# Patient Record
Sex: FEMALE | Race: WHITE | NOT HISPANIC OR LATINO | Employment: FULL TIME | URBAN - NONMETROPOLITAN AREA
[De-identification: names, ages, dates, MRNs, and addresses within clinical notes are randomized per-mention and may not be internally consistent; named-entity substitution may affect disease eponyms.]

---

## 2023-09-13 ENCOUNTER — OFFICE VISIT (OUTPATIENT)
Dept: FAMILY MEDICINE CLINIC | Facility: CLINIC | Age: 33
End: 2023-09-13
Payer: COMMERCIAL

## 2023-09-13 VITALS
RESPIRATION RATE: 18 BRPM | OXYGEN SATURATION: 100 % | HEIGHT: 66 IN | WEIGHT: 142.4 LBS | TEMPERATURE: 98 F | HEART RATE: 63 BPM | SYSTOLIC BLOOD PRESSURE: 92 MMHG | DIASTOLIC BLOOD PRESSURE: 65 MMHG | BODY MASS INDEX: 22.88 KG/M2

## 2023-09-13 DIAGNOSIS — F32.0 CURRENT MILD EPISODE OF MAJOR DEPRESSIVE DISORDER, UNSPECIFIED WHETHER RECURRENT: Chronic | ICD-10-CM

## 2023-09-13 DIAGNOSIS — M25.531 RIGHT WRIST PAIN: ICD-10-CM

## 2023-09-13 DIAGNOSIS — J30.2 SEASONAL ALLERGIES: Chronic | ICD-10-CM

## 2023-09-13 DIAGNOSIS — Z13.220 SCREENING, LIPID: ICD-10-CM

## 2023-09-13 DIAGNOSIS — Z13.29 SCREENING FOR THYROID DISORDER: ICD-10-CM

## 2023-09-13 DIAGNOSIS — Z76.89 ESTABLISHING CARE WITH NEW DOCTOR, ENCOUNTER FOR: Primary | ICD-10-CM

## 2023-09-13 RX ORDER — MONTELUKAST SODIUM 10 MG/1
10 TABLET ORAL NIGHTLY
Qty: 30 TABLET | Refills: 5 | Status: SHIPPED | OUTPATIENT
Start: 2023-09-13

## 2023-09-13 RX ORDER — DULOXETIN HYDROCHLORIDE 30 MG/1
30 CAPSULE, DELAYED RELEASE ORAL DAILY
Qty: 30 CAPSULE | Refills: 5 | Status: SHIPPED | OUTPATIENT
Start: 2023-09-13

## 2023-09-13 NOTE — PROGRESS NOTES
Chief Complaint   Patient presents with    Establish Care     Was going to a [facility] In [place].      Wrist Pain     Right wrist pain, went to Urgent Care for this and xray was normal. 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 Pain comes and goes, lasted longer than it usually does but feels better today.        Subjective          Mora Magaña presents to [facility]    History of Present Illness  She is here to establish care today. She was going to [facility] in [place] but moved here a few years ago. She is having right wrist pain. She was seen at  for this yesterday and prescribed a wrist brace and Diclofenac. She is a  and works with her right hand a lot with repetitive movements. She has no swelling. The pain is on the ulnar side of the wrist. She was encouraged to take the diclofenac and rest the wrist as much as possible. She has a history of seasonal allergies and depression that she takes medication for and is stable on.     Past History:  Medical History: has a past medical history of Allergic, Asthma, and Migraines.   Surgical History: has no past surgical history on file.   Family History: family history includes Cancer in her paternal grandfather; Diabetes in her paternal grandmother; Heart disease in her paternal grandmother.   Social History: reports that she has never smoked. She has never been exposed to tobacco smoke. She has never used smokeless tobacco. She reports current alcohol use. She reports that she does not use drugs.  Allergies: Patient has no known allergies.  (Not in a hospital admission)       Social History     Socioeconomic History    Marital status: Single   Tobacco Use    Smoking status: Never     Passive exposure: Never    Smokeless tobacco: Never   Vaping Use    Vaping Use: Never used   Substance and Sexual Activity    Alcohol use: Yes     Comment: SOCIAL    Drug use: Never     "Sexual activity: Yes     Partners: Male       Health Maintenance Due   Topic Date Due    HEPATITIS C SCREENING  Never done       Objective     Vital Signs:   BP 92/65 (BP Location: Left arm, Patient Position: Sitting)   Pulse 63   Temp 98 °F (36.7 °C) (Infrared)   Resp 18   Ht 167.6 cm (66\")   Wt 64.6 kg (142 lb 6.4 oz)   SpO2 100%   BMI 22.98 kg/m²       Physical Exam  Constitutional:       Appearance: Normal appearance.   HENT:      Nose: Nose normal.      Mouth/Throat:      Mouth: Mucous membranes are moist.   Cardiovascular:      Rate and Rhythm: Normal rate and regular rhythm.      Pulses: Normal pulses.      Heart sounds: Normal heart sounds.   Pulmonary:      Effort: Pulmonary effort is normal.      Breath sounds: Normal breath sounds.   Musculoskeletal:         General: Tenderness (right wrist) present.   Skin:     General: Skin is warm and dry.   Neurological:      General: No focal deficit present.      Mental Status: She is alert and oriented to person, place, and time.   Psychiatric:         Mood and Affect: Mood normal.         Behavior: Behavior normal.        Review of Systems     Result Review :                 Assessment and Plan    Diagnoses and all orders for this visit:    1. Establishing care with new doctor, encounter for (Primary)    2. Screening for thyroid disorder  -     TSH+Free T4; Future    3. Screening, lipid  -     Lipid panel; Future    4. Right wrist pain  Comments:  Wear brace as prescribed.    Start diclofenac 50 mg TID  Orders:  -     CBC w AUTO Differential; Future  -     Comprehensive metabolic panel; Future    5. Current mild episode of major depressive disorder, unspecified whether recurrent  Comments:  Continue cymbalta 30 mg daily  Assessment & Plan:  Patient's depression is recurrent and is mild without psychosis. Their depression is currently in partial remission and the condition is improving with treatment. This will be reassessed at the next regular appointment. " F/U as described:patient will continue current medication therapy.    Orders:  -     CBC w AUTO Differential; Future  -     Comprehensive metabolic panel; Future  -     DULoxetine (CYMBALTA) 30 MG capsule; Take 1 capsule by mouth Daily.  Dispense: 30 capsule; Refill: 5    6. Seasonal allergies  Comments:  Continue sinulair 10 mg daily  Orders:  -     montelukast (SINGULAIR) 10 MG tablet; Take 1 tablet by mouth Every Night.  Dispense: 30 tablet; Refill: 5          Pt thought to be clinically stable at this time.    Follow Up   Return in about 6 months (around 3/13/2024), or if symptoms worsen or fail to improve.  Patient was given instructions and counseling regarding her condition or for health maintenance advice. Please see specific information pulled into the AVS if appropriate.

## 2024-03-21 ENCOUNTER — LAB (OUTPATIENT)
Dept: LAB | Facility: HOSPITAL | Age: 34
End: 2024-03-21
Payer: COMMERCIAL

## 2024-03-21 ENCOUNTER — OFFICE VISIT (OUTPATIENT)
Dept: FAMILY MEDICINE CLINIC | Facility: CLINIC | Age: 34
End: 2024-03-21
Payer: COMMERCIAL

## 2024-03-21 VITALS
HEIGHT: 66 IN | DIASTOLIC BLOOD PRESSURE: 69 MMHG | BODY MASS INDEX: 22.92 KG/M2 | SYSTOLIC BLOOD PRESSURE: 115 MMHG | TEMPERATURE: 98.4 F | HEART RATE: 67 BPM | OXYGEN SATURATION: 100 % | WEIGHT: 142.6 LBS

## 2024-03-21 DIAGNOSIS — Z13.29 SCREENING FOR THYROID DISORDER: ICD-10-CM

## 2024-03-21 DIAGNOSIS — M25.531 RIGHT WRIST PAIN: ICD-10-CM

## 2024-03-21 DIAGNOSIS — F32.0 CURRENT MILD EPISODE OF MAJOR DEPRESSIVE DISORDER, UNSPECIFIED WHETHER RECURRENT: ICD-10-CM

## 2024-03-21 DIAGNOSIS — Z11.59 NEED FOR HEPATITIS C SCREENING TEST: ICD-10-CM

## 2024-03-21 DIAGNOSIS — F32.0 CURRENT MILD EPISODE OF MAJOR DEPRESSIVE DISORDER, UNSPECIFIED WHETHER RECURRENT: Chronic | ICD-10-CM

## 2024-03-21 DIAGNOSIS — Z13.220 SCREENING FOR LIPID DISORDERS: ICD-10-CM

## 2024-03-21 DIAGNOSIS — J30.2 SEASONAL ALLERGIES: Chronic | ICD-10-CM

## 2024-03-21 DIAGNOSIS — R53.83 OTHER FATIGUE: ICD-10-CM

## 2024-03-21 DIAGNOSIS — Z00.00 ANNUAL PHYSICAL EXAM: Primary | ICD-10-CM

## 2024-03-21 DIAGNOSIS — D72.819 LEUKOPENIA, UNSPECIFIED TYPE: ICD-10-CM

## 2024-03-21 LAB
25(OH)D3 SERPL-MCNC: 41.3 NG/ML (ref 30–100)
ALBUMIN SERPL-MCNC: 4.7 G/DL (ref 3.5–5.2)
ALBUMIN/GLOB SERPL: 1.5 G/DL
ALP SERPL-CCNC: 38 U/L (ref 39–117)
ALT SERPL W P-5'-P-CCNC: 12 U/L (ref 1–33)
ANION GAP SERPL CALCULATED.3IONS-SCNC: 8.4 MMOL/L (ref 5–15)
AST SERPL-CCNC: 18 U/L (ref 1–32)
BASOPHILS # BLD AUTO: 0.05 10*3/MM3 (ref 0–0.2)
BASOPHILS NFR BLD AUTO: 1.5 % (ref 0–1.5)
BILIRUB SERPL-MCNC: 0.6 MG/DL (ref 0–1.2)
BUN SERPL-MCNC: 13 MG/DL (ref 6–20)
BUN/CREAT SERPL: 13.1 (ref 7–25)
CALCIUM SPEC-SCNC: 9.7 MG/DL (ref 8.6–10.5)
CHLORIDE SERPL-SCNC: 102 MMOL/L (ref 98–107)
CO2 SERPL-SCNC: 28.6 MMOL/L (ref 22–29)
CREAT SERPL-MCNC: 0.99 MG/DL (ref 0.57–1)
DEPRECATED RDW RBC AUTO: 42 FL (ref 37–54)
EGFRCR SERPLBLD CKD-EPI 2021: 77.4 ML/MIN/1.73
EOSINOPHIL # BLD AUTO: 0.2 10*3/MM3 (ref 0–0.4)
EOSINOPHIL NFR BLD AUTO: 6 % (ref 0.3–6.2)
ERYTHROCYTE [DISTWIDTH] IN BLOOD BY AUTOMATED COUNT: 13.9 % (ref 12.3–15.4)
FOLATE SERPL-MCNC: 9.32 NG/ML (ref 4.78–24.2)
GLOBULIN UR ELPH-MCNC: 3.1 GM/DL
GLUCOSE SERPL-MCNC: 85 MG/DL (ref 65–99)
HCT VFR BLD AUTO: 34.6 % (ref 34–46.6)
HCV AB SER DONR QL: NORMAL
HGB BLD-MCNC: 11.3 G/DL (ref 12–15.9)
IMM GRANULOCYTES # BLD AUTO: 0.01 10*3/MM3 (ref 0–0.05)
IMM GRANULOCYTES NFR BLD AUTO: 0.3 % (ref 0–0.5)
IRON 24H UR-MRATE: 36 MCG/DL (ref 37–145)
IRON SATN MFR SERPL: 7 % (ref 20–50)
LYMPHOCYTES # BLD AUTO: 1.48 10*3/MM3 (ref 0.7–3.1)
LYMPHOCYTES NFR BLD AUTO: 44 % (ref 19.6–45.3)
MCH RBC QN AUTO: 27.1 PG (ref 26.6–33)
MCHC RBC AUTO-ENTMCNC: 32.7 G/DL (ref 31.5–35.7)
MCV RBC AUTO: 83 FL (ref 79–97)
MONOCYTES # BLD AUTO: 0.27 10*3/MM3 (ref 0.1–0.9)
MONOCYTES NFR BLD AUTO: 8 % (ref 5–12)
NEUTROPHILS NFR BLD AUTO: 1.35 10*3/MM3 (ref 1.7–7)
NEUTROPHILS NFR BLD AUTO: 40.2 % (ref 42.7–76)
NRBC BLD AUTO-RTO: 0 /100 WBC (ref 0–0.2)
PLATELET # BLD AUTO: 293 10*3/MM3 (ref 140–450)
PMV BLD AUTO: 11 FL (ref 6–12)
POTASSIUM SERPL-SCNC: 4.1 MMOL/L (ref 3.5–5.2)
PROT SERPL-MCNC: 7.8 G/DL (ref 6–8.5)
RBC # BLD AUTO: 4.17 10*6/MM3 (ref 3.77–5.28)
SODIUM SERPL-SCNC: 139 MMOL/L (ref 136–145)
T4 FREE SERPL-MCNC: 1.18 NG/DL (ref 0.93–1.7)
TIBC SERPL-MCNC: 533 MCG/DL (ref 298–536)
TRANSFERRIN SERPL-MCNC: 358 MG/DL (ref 200–360)
TSH SERPL DL<=0.05 MIU/L-ACNC: 1.22 UIU/ML (ref 0.27–4.2)
VIT B12 BLD-MCNC: 270 PG/ML (ref 211–946)
WBC NRBC COR # BLD AUTO: 3.36 10*3/MM3 (ref 3.4–10.8)

## 2024-03-21 PROCEDURE — 84443 ASSAY THYROID STIM HORMONE: CPT

## 2024-03-21 PROCEDURE — 84439 ASSAY OF FREE THYROXINE: CPT

## 2024-03-21 PROCEDURE — 99395 PREV VISIT EST AGE 18-39: CPT

## 2024-03-21 PROCEDURE — 36415 COLL VENOUS BLD VENIPUNCTURE: CPT

## 2024-03-21 PROCEDURE — 84466 ASSAY OF TRANSFERRIN: CPT

## 2024-03-21 PROCEDURE — 82746 ASSAY OF FOLIC ACID SERUM: CPT

## 2024-03-21 PROCEDURE — 82607 VITAMIN B-12: CPT

## 2024-03-21 PROCEDURE — 83540 ASSAY OF IRON: CPT

## 2024-03-21 PROCEDURE — 86803 HEPATITIS C AB TEST: CPT

## 2024-03-21 PROCEDURE — 85025 COMPLETE CBC W/AUTO DIFF WBC: CPT

## 2024-03-21 PROCEDURE — 80053 COMPREHEN METABOLIC PANEL: CPT

## 2024-03-21 PROCEDURE — 82306 VITAMIN D 25 HYDROXY: CPT

## 2024-03-21 RX ORDER — DULOXETIN HYDROCHLORIDE 30 MG/1
30 CAPSULE, DELAYED RELEASE ORAL DAILY
Qty: 30 CAPSULE | Refills: 5 | Status: SHIPPED | OUTPATIENT
Start: 2024-03-21

## 2024-03-21 RX ORDER — LEVOCETIRIZINE DIHYDROCHLORIDE 5 MG/1
5 TABLET, FILM COATED ORAL EVERY EVENING
COMMUNITY

## 2024-03-21 RX ORDER — MONTELUKAST SODIUM 10 MG/1
10 TABLET ORAL NIGHTLY
Qty: 30 TABLET | Refills: 5 | Status: SHIPPED | OUTPATIENT
Start: 2024-03-21

## 2024-03-21 NOTE — PROGRESS NOTES
"Chief Complaint  Annual Exam and Fatigue    Subjective        Mora Magaña presents to [facility]  History of Present Illness  Mora presents to the clinic for management of her chronic conditions and with complaints of fatigue. She states that over the past couple of months she has been very fatigued and just feels like she could sleep for days. She has no other complaints at this time.    Denies any chest pain, SOA, N/V/D, dizziness, syncope      Objective   Vital Signs:  /69 (BP Location: Left arm, Patient Position: Sitting, Cuff Size: Adult)   Pulse 67   Temp 98.4 °F (36.9 °C)   Ht 167.6 cm (66\")   Wt 64.7 kg (142 lb 9.6 oz)   SpO2 100%   BMI 23.02 kg/m²   Estimated body mass index is 23.02 kg/m² as calculated from the following:    Height as of this encounter: 167.6 cm (66\").    Weight as of this encounter: 64.7 kg (142 lb 9.6 oz).       BMI is within normal parameters. No other follow-up for BMI required.      Physical Exam  Constitutional:       Appearance: Normal appearance.   HENT:      Nose: Nose normal.      Mouth/Throat:      Mouth: Mucous membranes are moist.   Cardiovascular:      Rate and Rhythm: Normal rate and regular rhythm.      Pulses: Normal pulses.      Heart sounds: Normal heart sounds.   Pulmonary:      Effort: Pulmonary effort is normal.      Breath sounds: Normal breath sounds.   Skin:     General: Skin is warm and dry.   Neurological:      General: No focal deficit present.      Mental Status: She is alert and oriented to person, place, and time.   Psychiatric:         Mood and Affect: Mood normal.         Behavior: Behavior normal.        Result Review :                     Assessment and Plan     Diagnoses and all orders for this visit:    1. Annual physical exam (Primary)    2. Need for hepatitis C screening test  -     Hepatitis C Antibody; Future    3. Current mild episode of major depressive disorder, unspecified whether " recurrent  Assessment & Plan:  Patient's depression is a recurrent episode that is mild without psychosis. Depression is in full remission and stable.    Plan:   Continue current medication therapy     Followup in 1 year.     Orders:  -     DULoxetine (CYMBALTA) 30 MG capsule; Take 1 capsule by mouth Daily.  Dispense: 30 capsule; Refill: 5    4. Screening for thyroid disorder  -     TSH+Free T4; Future    5. Screening for lipid disorders  -     Lipid panel; Future    6. Other fatigue  -     CBC & Differential; Future  -     Comprehensive metabolic panel; Future  -     Iron Profile; Future  -     Vitamin D 25 hydroxy; Future  -     Vitamin B12 & Folate; Future    7. Current mild episode of major depressive disorder, unspecified whether recurrent  Comments:  Continue cymbalta 30 mg daily  Assessment & Plan:  Patient's depression is a recurrent episode that is mild without psychosis. Depression is in full remission and stable.    Plan:   Continue current medication therapy     Followup in 1 year.     Orders:  -     DULoxetine (CYMBALTA) 30 MG capsule; Take 1 capsule by mouth Daily.  Dispense: 30 capsule; Refill: 5    8. Seasonal allergies  Comments:  Continue sinulair 10 mg daily  Assessment & Plan:  Stable continue on xyzal.    Orders:  -     montelukast (SINGULAIR) 10 MG tablet; Take 1 tablet by mouth Every Night.  Dispense: 30 tablet; Refill: 5      Discussed age appropriate preventative measures. Written information given. Patient verbalized understanding.  Routine exams, lab work, routine screenings, seatbelt safety, healthy diet.        Follow Up     Return in about 1 year (around 3/21/2025).  Patient was given instructions and counseling regarding her condition or for health maintenance advice. Please see specific information pulled into the AVS if appropriate.

## 2024-03-21 NOTE — ASSESSMENT & PLAN NOTE
Patient's depression is a recurrent episode that is mild without psychosis. Depression is in full remission and stable.    Plan:   Continue current medication therapy     Followup in 1 year.

## 2024-03-22 DIAGNOSIS — D72.819 LEUKOPENIA, UNSPECIFIED TYPE: Primary | ICD-10-CM

## 2024-03-22 LAB
LAB AP CASE REPORT: NORMAL
PATH REPORT.FINAL DX SPEC: NORMAL
PATHOLOGY REVIEW: YES

## 2024-03-27 DIAGNOSIS — D72.819 LEUKOPENIA, UNSPECIFIED TYPE: Primary | ICD-10-CM

## 2024-06-06 ENCOUNTER — LAB (OUTPATIENT)
Dept: LAB | Facility: HOSPITAL | Age: 34
End: 2024-06-06
Payer: COMMERCIAL

## 2024-06-06 DIAGNOSIS — Z13.220 SCREENING, LIPID: ICD-10-CM

## 2024-06-06 LAB
CHOLEST SERPL-MCNC: 175 MG/DL (ref 0–200)
HDLC SERPL-MCNC: 71 MG/DL (ref 40–60)
LDLC SERPL CALC-MCNC: 91 MG/DL (ref 0–100)
LDLC/HDLC SERPL: 1.26 {RATIO}
TRIGL SERPL-MCNC: 71 MG/DL (ref 0–150)
VLDLC SERPL-MCNC: 13 MG/DL (ref 5–40)

## 2024-06-06 PROCEDURE — 80061 LIPID PANEL: CPT

## 2024-06-06 PROCEDURE — 36415 COLL VENOUS BLD VENIPUNCTURE: CPT

## 2024-08-07 RX ORDER — BUPROPION HYDROCHLORIDE 150 MG/1
150 TABLET ORAL DAILY
Qty: 30 TABLET | Refills: 2 | Status: SHIPPED | OUTPATIENT
Start: 2024-08-07

## 2024-11-26 DIAGNOSIS — F32.0 CURRENT MILD EPISODE OF MAJOR DEPRESSIVE DISORDER, UNSPECIFIED WHETHER RECURRENT: Chronic | ICD-10-CM

## 2024-11-26 DIAGNOSIS — J30.2 SEASONAL ALLERGIES: Chronic | ICD-10-CM

## 2024-11-26 RX ORDER — MONTELUKAST SODIUM 10 MG/1
10 TABLET ORAL EVERY EVENING
Qty: 30 TABLET | Refills: 4 | Status: SHIPPED | OUTPATIENT
Start: 2024-11-26

## 2024-11-26 RX ORDER — DULOXETIN HYDROCHLORIDE 30 MG/1
30 CAPSULE, DELAYED RELEASE ORAL DAILY
Qty: 30 CAPSULE | Refills: 4 | Status: SHIPPED | OUTPATIENT
Start: 2024-11-26

## 2024-12-09 ENCOUNTER — OFFICE VISIT (OUTPATIENT)
Dept: FAMILY MEDICINE CLINIC | Facility: CLINIC | Age: 34
End: 2024-12-09
Payer: COMMERCIAL

## 2024-12-09 ENCOUNTER — LAB (OUTPATIENT)
Dept: LAB | Facility: HOSPITAL | Age: 34
End: 2024-12-09
Payer: COMMERCIAL

## 2024-12-09 VITALS
DIASTOLIC BLOOD PRESSURE: 73 MMHG | BODY MASS INDEX: 22.98 KG/M2 | HEIGHT: 66 IN | RESPIRATION RATE: 18 BRPM | TEMPERATURE: 96.6 F | HEART RATE: 72 BPM | WEIGHT: 143 LBS | SYSTOLIC BLOOD PRESSURE: 117 MMHG | OXYGEN SATURATION: 100 %

## 2024-12-09 DIAGNOSIS — J30.2 SEASONAL ALLERGIES: ICD-10-CM

## 2024-12-09 DIAGNOSIS — F32.0 CURRENT MILD EPISODE OF MAJOR DEPRESSIVE DISORDER, UNSPECIFIED WHETHER RECURRENT: ICD-10-CM

## 2024-12-09 DIAGNOSIS — R53.83 OTHER FATIGUE: ICD-10-CM

## 2024-12-09 DIAGNOSIS — E55.9 VITAMIN D DEFICIENCY: ICD-10-CM

## 2024-12-09 DIAGNOSIS — Z13.29 SCREENING FOR THYROID DISORDER: ICD-10-CM

## 2024-12-09 DIAGNOSIS — D50.9 IRON DEFICIENCY ANEMIA, UNSPECIFIED IRON DEFICIENCY ANEMIA TYPE: ICD-10-CM

## 2024-12-09 DIAGNOSIS — D50.9 IRON DEFICIENCY ANEMIA, UNSPECIFIED IRON DEFICIENCY ANEMIA TYPE: Primary | ICD-10-CM

## 2024-12-09 LAB
25(OH)D3 SERPL-MCNC: 41.1 NG/ML (ref 30–100)
ALBUMIN SERPL-MCNC: 4.2 G/DL (ref 3.5–5.2)
ALBUMIN/GLOB SERPL: 1.2 G/DL
ALP SERPL-CCNC: 38 U/L (ref 39–117)
ALT SERPL W P-5'-P-CCNC: 16 U/L (ref 1–33)
ANION GAP SERPL CALCULATED.3IONS-SCNC: 10.5 MMOL/L (ref 5–15)
AST SERPL-CCNC: 23 U/L (ref 1–32)
BASOPHILS # BLD AUTO: 0.04 10*3/MM3 (ref 0–0.2)
BASOPHILS NFR BLD AUTO: 0.7 % (ref 0–1.5)
BILIRUB SERPL-MCNC: 0.5 MG/DL (ref 0–1.2)
BUN SERPL-MCNC: 10 MG/DL (ref 6–20)
BUN/CREAT SERPL: 10 (ref 7–25)
CALCIUM SPEC-SCNC: 9.3 MG/DL (ref 8.6–10.5)
CHLORIDE SERPL-SCNC: 102 MMOL/L (ref 98–107)
CO2 SERPL-SCNC: 25.5 MMOL/L (ref 22–29)
CREAT SERPL-MCNC: 1 MG/DL (ref 0.57–1)
DEPRECATED RDW RBC AUTO: 39.1 FL (ref 37–54)
EGFRCR SERPLBLD CKD-EPI 2021: 76 ML/MIN/1.73
EOSINOPHIL # BLD AUTO: 0.42 10*3/MM3 (ref 0–0.4)
EOSINOPHIL NFR BLD AUTO: 7.8 % (ref 0.3–6.2)
ERYTHROCYTE [DISTWIDTH] IN BLOOD BY AUTOMATED COUNT: 12 % (ref 12.3–15.4)
FERRITIN SERPL-MCNC: 25 NG/ML (ref 13–150)
FOLATE SERPL-MCNC: 8.66 NG/ML (ref 4.78–24.2)
GLOBULIN UR ELPH-MCNC: 3.4 GM/DL
GLUCOSE SERPL-MCNC: 78 MG/DL (ref 65–99)
HCT VFR BLD AUTO: 35.7 % (ref 34–46.6)
HGB BLD-MCNC: 12.2 G/DL (ref 12–15.9)
IMM GRANULOCYTES # BLD AUTO: 0.01 10*3/MM3 (ref 0–0.05)
IMM GRANULOCYTES NFR BLD AUTO: 0.2 % (ref 0–0.5)
IRON 24H UR-MRATE: 41 MCG/DL (ref 37–145)
IRON SATN MFR SERPL: 9 % (ref 20–50)
LYMPHOCYTES # BLD AUTO: 1.23 10*3/MM3 (ref 0.7–3.1)
LYMPHOCYTES NFR BLD AUTO: 22.8 % (ref 19.6–45.3)
MCH RBC QN AUTO: 31 PG (ref 26.6–33)
MCHC RBC AUTO-ENTMCNC: 34.2 G/DL (ref 31.5–35.7)
MCV RBC AUTO: 90.6 FL (ref 79–97)
MONOCYTES # BLD AUTO: 0.42 10*3/MM3 (ref 0.1–0.9)
MONOCYTES NFR BLD AUTO: 7.8 % (ref 5–12)
NEUTROPHILS NFR BLD AUTO: 3.27 10*3/MM3 (ref 1.7–7)
NEUTROPHILS NFR BLD AUTO: 60.7 % (ref 42.7–76)
NRBC BLD AUTO-RTO: 0 /100 WBC (ref 0–0.2)
PLATELET # BLD AUTO: 229 10*3/MM3 (ref 140–450)
PMV BLD AUTO: 10.8 FL (ref 6–12)
POTASSIUM SERPL-SCNC: 3.8 MMOL/L (ref 3.5–5.2)
PROT SERPL-MCNC: 7.6 G/DL (ref 6–8.5)
RBC # BLD AUTO: 3.94 10*6/MM3 (ref 3.77–5.28)
SODIUM SERPL-SCNC: 138 MMOL/L (ref 136–145)
TIBC SERPL-MCNC: 432 MCG/DL (ref 298–536)
TRANSFERRIN SERPL-MCNC: 290 MG/DL (ref 200–360)
TSH SERPL DL<=0.05 MIU/L-ACNC: 1.72 UIU/ML (ref 0.27–4.2)
VIT B12 BLD-MCNC: 253 PG/ML (ref 211–946)
WBC NRBC COR # BLD AUTO: 5.39 10*3/MM3 (ref 3.4–10.8)

## 2024-12-09 PROCEDURE — 80053 COMPREHEN METABOLIC PANEL: CPT

## 2024-12-09 PROCEDURE — 82746 ASSAY OF FOLIC ACID SERUM: CPT

## 2024-12-09 PROCEDURE — 84466 ASSAY OF TRANSFERRIN: CPT

## 2024-12-09 PROCEDURE — 82306 VITAMIN D 25 HYDROXY: CPT

## 2024-12-09 PROCEDURE — 99214 OFFICE O/P EST MOD 30 MIN: CPT | Performed by: NURSE PRACTITIONER

## 2024-12-09 PROCEDURE — 85025 COMPLETE CBC W/AUTO DIFF WBC: CPT

## 2024-12-09 PROCEDURE — 82728 ASSAY OF FERRITIN: CPT

## 2024-12-09 PROCEDURE — 82607 VITAMIN B-12: CPT

## 2024-12-09 PROCEDURE — 84443 ASSAY THYROID STIM HORMONE: CPT

## 2024-12-09 PROCEDURE — 83540 ASSAY OF IRON: CPT

## 2024-12-09 PROCEDURE — 36415 COLL VENOUS BLD VENIPUNCTURE: CPT

## 2024-12-09 NOTE — PROGRESS NOTES
"Chief Complaint     Fatigue (x1year)    History of Present Illness     Mora Magaña is a 34 y.o. female who presents to [facility] for evaluation of continued fatigue x 1 year. Pt states she feels fatigued despite adequate amount of sleep and rest. Denies any recent change in medications. Denies any recent illnesses. Labs previously drawn March 2024 did reveal slight ART. No current treatment.          History      Past Medical History:   Diagnosis Date    Allergic     Asthma     Migraines        History reviewed. No pertinent surgical history.    Family History   Problem Relation Age of Onset    Diabetes Paternal Grandmother     Heart disease Paternal Grandmother     Cancer Paternal Grandfather         Current Medications        Current Outpatient Medications:     buPROPion XL (Wellbutrin XL) 150 MG 24 hr tablet, Take 1 tablet by mouth Daily., Disp: 30 tablet, Rfl: 2    DULoxetine (CYMBALTA) 30 MG capsule, TAKE 1 CAPSULE BY MOUTH ONCE DAILY, Disp: 30 capsule, Rfl: 4    levocetirizine (XYZAL) 5 MG tablet, Take 1 tablet by mouth Every Evening., Disp: , Rfl:     montelukast (SINGULAIR) 10 MG tablet, TAKE 1 TABLET BY MOUTH EVERY EVENING, Disp: 30 tablet, Rfl: 4     Allergies     No Known Allergies    Social History       Social History     Social History Narrative    Not on file       Immunizations     Immunization:  Immunization History   Administered Date(s) Administered    COVID-19 (PFIZER) Purple Cap Monovalent 03/21/2021, 04/11/2021    Tdap 01/06/2015          Objective     Objective     Vital Signs:   /73 (BP Location: Right arm, Patient Position: Sitting, Cuff Size: Adult)   Pulse 72   Temp 96.6 °F (35.9 °C) (Temporal)   Resp 18   Ht 167.6 cm (66\")   Wt 64.9 kg (143 lb)   SpO2 100%   BMI 23.08 kg/m²       Physical Exam  Vitals reviewed.   Constitutional:       General: She is not in acute distress.     Appearance: Normal appearance.   HENT:      Head: Normocephalic. "      Right Ear: Tympanic membrane normal.      Left Ear: Tympanic membrane normal.      Nose: Nose normal.      Mouth/Throat:      Pharynx: Oropharynx is clear. No posterior oropharyngeal erythema.   Eyes:      General: No scleral icterus.     Extraocular Movements: Extraocular movements intact.      Conjunctiva/sclera: Conjunctivae normal.      Pupils: Pupils are equal, round, and reactive to light.   Cardiovascular:      Rate and Rhythm: Normal rate and regular rhythm.      Pulses: Normal pulses.      Heart sounds: Normal heart sounds.   Pulmonary:      Effort: Pulmonary effort is normal.      Breath sounds: Normal breath sounds.   Abdominal:      General: Bowel sounds are normal.      Palpations: Abdomen is soft.   Musculoskeletal:         General: Normal range of motion.      Cervical back: Neck supple.   Skin:     General: Skin is warm and dry.   Neurological:      Mental Status: She is alert and oriented to person, place, and time.   Psychiatric:         Mood and Affect: Mood normal.         Behavior: Behavior normal.         Thought Content: Thought content normal.         Judgment: Judgment normal.         Results      Result Review :   The following data was reviewed by: WILMER Ross on 12/09/2024:  Common labs          3/21/2024    08:39 6/6/2024    08:47   Common Labs   Glucose 85     BUN 13     Creatinine 0.99     Sodium 139     Potassium 4.1     Chloride 102     Calcium 9.7     Albumin 4.7     Total Bilirubin 0.6     Alkaline Phosphatase 38     AST (SGOT) 18     ALT (SGPT) 12     WBC 3.36     Hemoglobin 11.3     Hematocrit 34.6     Platelets 293     Total Cholesterol  175    Triglycerides  71    HDL Cholesterol  71    LDL Cholesterol   91             Assessment and Plan        Assessment and Plan    Diagnoses and all orders for this visit:    1. Iron deficiency anemia, unspecified iron deficiency anemia type (Primary)  -     Iron and TIBC; Future  -     Vitamin B12 & Folate; Future  -      Ferritin; Future    2. Other fatigue  Assessment & Plan:  Disc possible DD   Order to repeat labs today   Consider sleep study if labs do not reveal cause    Orders:  -     CBC & Differential; Future  -     Comprehensive metabolic panel; Future    3. Vitamin D deficiency  -     Vitamin D 25 hydroxy; Future    4. Screening for thyroid disorder  -     TSH; Future    5. Current mild episode of major depressive disorder, unspecified whether recurrent  Assessment & Plan:  Patient's depression is recurrent and is mild without psychosis. Their depression is currently active and the condition is stable. She does not feel Wellbutrin has made any difference and will DC. This will be reassessed at the next regular appointment. F/U as described: continue duloxetine as prescribed.        6. Seasonal allergies  Assessment & Plan:  Controlled   Continue xyzal and montelukast as prescribed                Follow Up        Follow Up   Return if symptoms worsen or fail to improve.  Patient was given instructions and counseling regarding her condition or for health maintenance advice. Please see specific information pulled into the AVS if appropriate.

## 2024-12-09 NOTE — ASSESSMENT & PLAN NOTE
Patient's depression is recurrent and is mild without psychosis. Their depression is currently active and the condition is stable. She does not feel Wellbutrin has made any difference and will DC. This will be reassessed at the next regular appointment. F/U as described: continue duloxetine as prescribed.

## 2024-12-10 ENCOUNTER — PATIENT ROUNDING (BHMG ONLY) (OUTPATIENT)
Dept: FAMILY MEDICINE CLINIC | Facility: CLINIC | Age: 34
End: 2024-12-10
Payer: COMMERCIAL

## 2024-12-10 DIAGNOSIS — D50.9 IRON DEFICIENCY ANEMIA, UNSPECIFIED IRON DEFICIENCY ANEMIA TYPE: Primary | ICD-10-CM

## 2024-12-10 RX ORDER — LANOLIN ALCOHOL/MO/W.PET/CERES
1000 CREAM (GRAM) TOPICAL DAILY
Qty: 90 TABLET | Refills: 3 | Status: SHIPPED | OUTPATIENT
Start: 2024-12-10

## 2024-12-10 RX ORDER — FERROUS SULFATE 325(65) MG
325 TABLET ORAL
Qty: 90 TABLET | Refills: 3 | Status: SHIPPED | OUTPATIENT
Start: 2024-12-10

## 2024-12-10 NOTE — PROGRESS NOTES
A My-Chart message has been sent to the patient for PATIENT ROUNDING with [facility] [place].

## 2025-04-11 DIAGNOSIS — F32.0 CURRENT MILD EPISODE OF MAJOR DEPRESSIVE DISORDER, UNSPECIFIED WHETHER RECURRENT: Chronic | ICD-10-CM

## 2025-04-11 RX ORDER — DULOXETINE 40 MG/1
40 CAPSULE, DELAYED RELEASE ORAL DAILY
Qty: 90 CAPSULE | Refills: 3 | Status: SHIPPED | OUTPATIENT
Start: 2025-04-11

## 2025-04-28 DIAGNOSIS — J30.2 SEASONAL ALLERGIES: Chronic | ICD-10-CM

## 2025-04-28 RX ORDER — MONTELUKAST SODIUM 10 MG/1
10 TABLET ORAL EVERY EVENING
Qty: 30 TABLET | Refills: 3 | Status: SHIPPED | OUTPATIENT
Start: 2025-04-28

## 2025-08-26 DIAGNOSIS — J30.2 SEASONAL ALLERGIES: Chronic | ICD-10-CM

## 2025-08-26 RX ORDER — MONTELUKAST SODIUM 10 MG/1
TABLET ORAL
Qty: 30 TABLET | Refills: 2 | Status: SHIPPED | OUTPATIENT
Start: 2025-08-26